# Patient Record
Sex: FEMALE | Race: WHITE | NOT HISPANIC OR LATINO | URBAN - METROPOLITAN AREA
[De-identification: names, ages, dates, MRNs, and addresses within clinical notes are randomized per-mention and may not be internally consistent; named-entity substitution may affect disease eponyms.]

---

## 2017-10-16 NOTE — PATIENT DISCUSSION
Acute Iritis Counseling: Iritis is an inflammation that affects your eye's iris. In isolated circumstances this is usually idiopathic, however recurrent intraocular inflammation may be associated with systemic disease and medical and laboratory workup may be necessary in conjunction with the patient?s primary care doctor. I have explained to the patient that successful management is dependent on patient compliance with treatment as prescribed and/or the treatment regimen.

## 2017-10-16 NOTE — PATIENT DISCUSSION
New Prescription: Pred Forte (prednisolone acetate): drops,suspension: 1% 1 drop four times a day into right eye 12-

## 2017-10-16 NOTE — PATIENT DISCUSSION
ACUTE IRITIS,  OD- PRESCRIBE PREDFORTE QID OD. 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 FOLLOW-UP 1 WEEK W/ DR. TOLBERT.

## 2017-10-23 NOTE — PATIENT DISCUSSION
Continue: Pred Forte (prednisolone acetate): drops,suspension: 1% 1 drop four times a day into right eye 12-

## 2020-06-30 ENCOUNTER — APPOINTMENT (RX ONLY)
Dept: URBAN - METROPOLITAN AREA CLINIC 131 | Facility: CLINIC | Age: 55
Setting detail: DERMATOLOGY
End: 2020-06-30

## 2020-06-30 VITALS — TEMPERATURE: 99 F

## 2020-06-30 DIAGNOSIS — Z41.9 ENCOUNTER FOR PROCEDURE FOR PURPOSES OTHER THAN REMEDYING HEALTH STATE, UNSPECIFIED: ICD-10-CM

## 2020-06-30 PROCEDURE — ? BOTOX

## 2020-06-30 PROCEDURE — ? COSMETIC CONSULTATION: FILLERS

## 2020-06-30 ASSESSMENT — LOCATION SIMPLE DESCRIPTION DERM
LOCATION SIMPLE: GLABELLA
LOCATION SIMPLE: LEFT EYEBROW
LOCATION SIMPLE: LEFT CHEEK
LOCATION SIMPLE: RIGHT EYEBROW
LOCATION SIMPLE: LEFT FOREHEAD
LOCATION SIMPLE: RIGHT TEMPLE
LOCATION SIMPLE: RIGHT FOREHEAD
LOCATION SIMPLE: RIGHT CHEEK
LOCATION SIMPLE: LEFT TEMPLE

## 2020-06-30 ASSESSMENT — LOCATION DETAILED DESCRIPTION DERM
LOCATION DETAILED: LEFT INFERIOR MEDIAL FOREHEAD
LOCATION DETAILED: LEFT INFERIOR TEMPLE
LOCATION DETAILED: RIGHT LATERAL FOREHEAD
LOCATION DETAILED: LEFT FOREHEAD
LOCATION DETAILED: LEFT CENTRAL BUCCAL CHEEK
LOCATION DETAILED: RIGHT SUPERIOR CENTRAL MALAR CHEEK
LOCATION DETAILED: GLABELLA
LOCATION DETAILED: LEFT LATERAL EYEBROW
LOCATION DETAILED: LEFT LATERAL FOREHEAD
LOCATION DETAILED: RIGHT INFERIOR MEDIAL FOREHEAD
LOCATION DETAILED: RIGHT INFERIOR TEMPLE
LOCATION DETAILED: RIGHT FOREHEAD
LOCATION DETAILED: RIGHT LATERAL EYEBROW
LOCATION DETAILED: RIGHT MEDIAL BUCCAL CHEEK
LOCATION DETAILED: LEFT SUPERIOR LATERAL MALAR CHEEK

## 2020-06-30 ASSESSMENT — LOCATION ZONE DERM: LOCATION ZONE: FACE

## 2020-06-30 NOTE — PROCEDURE: BOTOX
Show Orbicularis Oculi Units: Yes
Dilution (U/0.1 Cc): 4
Show Ucl Units: No
R Brow Units: 0
Additional Area 1 Location: [facility]
Glabellar Complex Units: 9
Post-Care Instructions: Patient instructed to not lie down for 4 hours and limit physical activity for 24 hours. Patient instructed not to travel by airplane for 48 hours.
Periorbital Skin Units: 24
Detail Level: Detailed
Forehead Units: [address]
Consent: Written consent obtained. Risks include but not limited to lid/brow ptosis, bruising, swelling, diplopia, temporary effect, incomplete chemical denervation.

## 2020-07-16 ENCOUNTER — APPOINTMENT (RX ONLY)
Dept: URBAN - METROPOLITAN AREA CLINIC 131 | Facility: CLINIC | Age: 55
Setting detail: DERMATOLOGY
End: 2020-07-16

## 2020-07-16 DIAGNOSIS — Z41.9 ENCOUNTER FOR PROCEDURE FOR PURPOSES OTHER THAN REMEDYING HEALTH STATE, UNSPECIFIED: ICD-10-CM

## 2020-07-16 PROCEDURE — ? BOTOX

## 2020-07-16 ASSESSMENT — LOCATION ZONE DERM: LOCATION ZONE: FACE

## 2020-07-16 ASSESSMENT — LOCATION SIMPLE DESCRIPTION DERM
LOCATION SIMPLE: LEFT FOREHEAD
LOCATION SIMPLE: RIGHT FOREHEAD

## 2020-07-16 ASSESSMENT — LOCATION DETAILED DESCRIPTION DERM
LOCATION DETAILED: LEFT LATERAL FOREHEAD
LOCATION DETAILED: RIGHT LATERAL FOREHEAD

## 2020-07-16 NOTE — PROCEDURE: BOTOX
Show Additional Area 2: Yes
Mentalis Units: 0
Detail Level: Detailed
Show Ucl Units: No
Consent: Written consent obtained. Risks include but not limited to lid/brow ptosis, bruising, swelling, diplopia, temporary effect, incomplete chemical denervation.
Forehead Units: 4
Post-Care Instructions: Patient instructed to not lie down for 4 hours and limit physical activity for 24 hours. Patient instructed not to travel by airplane for 48 hours.

## 2020-07-22 NOTE — PATIENT DISCUSSION
Stopped Today: Pred Forte (prednisolone acetate): drops,suspension: 1% 1 drop four times a day into right eye 12-

## 2020-07-22 NOTE — PATIENT DISCUSSION
HORDEOLUM RLL - RESOLVING. CONTINUE WARM COMPRESSES. UNDERSTANDS IT MAY TAKE 1-2 WEEKS TO FULLY RESOLVE. NO ANTIBIOTIC WARRANTED AT THIS TIME.

## 2020-08-25 NOTE — PATIENT DISCUSSION
[address] [facility]. [facility] ON IMPORTANCE OF MONITORING &amp; UV PROTECTION.

## 2022-01-27 ENCOUNTER — NEW PATIENT (OUTPATIENT)
Dept: URBAN - METROPOLITAN AREA CLINIC 44 | Facility: CLINIC | Age: 57
End: 2022-01-27

## 2022-01-27 DIAGNOSIS — L98.8: ICD-10-CM

## 2022-01-27 DIAGNOSIS — H02.834: ICD-10-CM

## 2022-01-27 DIAGNOSIS — H02.831: ICD-10-CM

## 2022-01-27 PROCEDURE — 99499 UNLISTED E&M SERVICE: CPT

## 2022-01-27 ASSESSMENT — VISUAL ACUITY
OD_SC: 20/20
OS_SC: 20/20

## 2022-01-27 NOTE — PATIENT DISCUSSION
Recommend Deep plane Mini lower lift, post-tragel, SMAS to 1cc NLF(discussed risks and benefits of sx. .. ).

## 2022-01-27 NOTE — PATIENT DISCUSSION
Recommend 1cc's of Juvederm ultra plus(discussed risks and benefits. ..) Start with SMAS, may need  the future.

## 2022-01-27 NOTE — PATIENT DISCUSSION
Recommend Bilateral upper lid blepharoplasty. cosmetic (discussed risks and benefits of sx. ..). Discussed pt does not need brow lift.

## 2022-02-09 ENCOUNTER — PRE-OP/H&P (OUTPATIENT)
Dept: URBAN - METROPOLITAN AREA CLINIC 39 | Facility: CLINIC | Age: 57
End: 2022-02-09

## 2022-02-09 DIAGNOSIS — H02.831: ICD-10-CM

## 2022-02-09 DIAGNOSIS — H02.834: ICD-10-CM

## 2022-02-09 DIAGNOSIS — L98.8: ICD-10-CM

## 2022-02-09 PROCEDURE — 99211HP H&P OFFICE/OUTPATIENT VISIT, EST

## 2022-02-09 RX ORDER — ONDANSETRON HYDROCHLORIDE 8 MG/1
TABLET, FILM COATED ORAL ONCE A DAY
Start: 2022-02-15

## 2022-02-09 RX ORDER — CEPHALEXIN 500 MG/1
1 CAPSULE ORAL TWICE A DAY
Start: 2022-02-15

## 2022-02-09 RX ORDER — ERYTHROMYCIN 5 MG/G
OINTMENT OPHTHALMIC
Start: 2022-02-15

## 2022-02-15 ENCOUNTER — SURGERY/PROCEDURE (OUTPATIENT)
Dept: URBAN - METROPOLITAN AREA SURGERY 14 | Facility: SURGERY | Age: 57
End: 2022-02-15

## 2022-02-15 ENCOUNTER — PRE-OP/H&P (OUTPATIENT)
Dept: URBAN - METROPOLITAN AREA SURGERY 14 | Facility: SURGERY | Age: 57
End: 2022-02-15

## 2022-02-15 DIAGNOSIS — H02.831: ICD-10-CM

## 2022-02-15 DIAGNOSIS — L98.8: ICD-10-CM

## 2022-02-15 DIAGNOSIS — Z41.1: ICD-10-CM

## 2022-02-15 DIAGNOSIS — H02.834: ICD-10-CM

## 2022-02-15 PROCEDURE — 15829F LOWER FACELIFT/PLATYSMAPLASTY ~ FEMALE

## 2022-02-15 PROCEDURE — 15822 BLEPHAROPLASTY UPPER EYELID: CPT

## 2022-02-15 PROCEDURE — 99211T TECH SERVICE

## 2022-02-15 PROCEDURE — 96999JUP JUVEDERM ULTRA PLUS 1 CC

## 2022-02-15 NOTE — PATIENT DISCUSSION
Patient informed of available non-opioid medications and other non-pharmacological options. Discussed advantages and disadvantages of the alternatives, including whether the patient is at-risk for, or has a history of, controlled substance abuse or misuse, and the patient’s personal preferences. [facility] “Opioid Alternative Pamphlet” was provided to patient.

## 2022-02-15 NOTE — PATIENT DISCUSSION
Patient informed of available non-opioid medications and other non-pharmacological options. Discussed advantages and disadvantages of the alternatives, including whether the patient is at-risk for, or has a history of, controlled substance abuse or misuse, and the patient’s personal preferences. [facility] “Opioid Alternative Pamphlet” was provided to patient.

## 2022-02-16 ENCOUNTER — POST-OP (OUTPATIENT)
Dept: URBAN - METROPOLITAN AREA CLINIC 39 | Facility: CLINIC | Age: 57
End: 2022-02-16

## 2022-02-16 DIAGNOSIS — Z98.890: ICD-10-CM

## 2022-02-16 PROCEDURE — 99024 POSTOP FOLLOW-UP VISIT: CPT

## 2022-02-16 ASSESSMENT — VISUAL ACUITY
OD_SC: 20/25
OS_SC: 20/40+2

## 2022-02-22 ENCOUNTER — POST-OP (OUTPATIENT)
Dept: URBAN - METROPOLITAN AREA CLINIC 39 | Facility: CLINIC | Age: 57
End: 2022-02-22

## 2022-02-22 DIAGNOSIS — Z98.890: ICD-10-CM

## 2022-02-22 DIAGNOSIS — L98.8: ICD-10-CM

## 2022-02-22 PROCEDURE — 99024 POSTOP FOLLOW-UP VISIT: CPT

## 2022-02-22 ASSESSMENT — VISUAL ACUITY
OD_SC: 20/40
OS_SC: 20/40

## 2022-02-22 NOTE — PATIENT DISCUSSION
One week post op: great curve and shape, no infection, healing well, sutures intact and removed, use erythromycin  BID to upper eyelids x 7-10 days.

## 2022-02-22 NOTE — PATIENT DISCUSSION
one Post op: great curve and shape, no infection, healing well, sutures intact, use erythromycin TID to upper eyelids, use cold packs, sleep at a 30 degree angle. Wear sunglasses and hat while outdoors. Avoid sun exposure.

## 2022-02-22 NOTE — PATIENT DISCUSSION
One week post op: great curve and shape, no infection, healing well, sutures intact and removed, use erythromycin  BID to upper eyelids x 7-10 days.  Wear sunglasses and hat while outdoors. Avoid sun exposure.

## 2022-02-24 NOTE — PATIENT DISCUSSION
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 RTC Monday for progress check.

## 2022-02-28 NOTE — PATIENT DISCUSSION
continue lotemax QHS OU until sample runs out, then switch to Refresh gel QHS with PFATs frequently throughout the day. RTC 1 week for progress check/flare up.

## 2022-03-07 ENCOUNTER — POST-OP (OUTPATIENT)
Dept: URBAN - METROPOLITAN AREA CLINIC 39 | Facility: CLINIC | Age: 57
End: 2022-03-07

## 2022-03-07 DIAGNOSIS — Z98.890: ICD-10-CM

## 2022-03-07 DIAGNOSIS — L98.8: ICD-10-CM

## 2022-03-07 PROCEDURE — 99024 POSTOP FOLLOW-UP VISIT: CPT

## 2022-03-07 ASSESSMENT — VISUAL ACUITY
OD_SC: 20/20
OS_SC: 20/30

## 2022-03-07 NOTE — PATIENT DISCUSSION
3 week post op: great curve and shape, no infection, healing well, sutures intact and removed 2-22-22, D/C erythromycin  BID to upper eyelids. Start skinuva BID to upper eyelids. Wear sunglasses and hat while outdoors. Avoid sun exposure. No restrictions.

## 2022-03-16 NOTE — PATIENT DISCUSSION
Ed. patient. Rx Augmentin BID x 10 days. warm compresses qid. Ed. on s/s of cellulitis including decreased vision, pain on eye movement and patient instructed to call if she experiences any symptoms. Monitor.

## 2022-04-06 ENCOUNTER — POST-OP (OUTPATIENT)
Dept: URBAN - METROPOLITAN AREA CLINIC 39 | Facility: CLINIC | Age: 57
End: 2022-04-06

## 2022-04-06 DIAGNOSIS — L98.8: ICD-10-CM

## 2022-04-06 DIAGNOSIS — Z98.890: ICD-10-CM

## 2022-04-06 PROCEDURE — 99024 POSTOP FOLLOW-UP VISIT: CPT

## 2022-04-06 ASSESSMENT — VISUAL ACUITY
OS_SC: 20/20
OD_SC: 20/20

## 2022-04-06 NOTE — PATIENT DISCUSSION
7week post op: great curve and shape, no infection, healing well, sutures intact and removed 2-22-22, Using skinuva BID to upper eyelids and facial line. Wear sunglasses and hat while outdoors. Avoid sun exposure. No restrictions.

## 2022-04-25 NOTE — PATIENT DISCUSSION
With the patients unilateral presentation one must also consider a Herpes virus as the cause for their chronic, recurrent unilateral inflammation.

## 2022-04-26 NOTE — PATIENT DISCUSSION
Patient understands condition, prognosis and need for follow up care. RTC 3 days for progress check.

## 2022-04-26 NOTE — PATIENT DISCUSSION
Patient instructed to continue use topical steroids (Pred q2omgdy- patient declines cycloplegic gtts at this time).

## 2022-04-29 NOTE — PATIENT DISCUSSION
Patient instructed to begin taper of topical steroids to QID x 3 days, TIDx2 days, BIDx2 days, QDx2 days then d/c. RTC if symptoms worsen.